# Patient Record
Sex: FEMALE | Race: BLACK OR AFRICAN AMERICAN | NOT HISPANIC OR LATINO | Employment: UNEMPLOYED | URBAN - METROPOLITAN AREA
[De-identification: names, ages, dates, MRNs, and addresses within clinical notes are randomized per-mention and may not be internally consistent; named-entity substitution may affect disease eponyms.]

---

## 2018-10-02 ENCOUNTER — HOSPITAL ENCOUNTER (EMERGENCY)
Facility: HOSPITAL | Age: 38
Discharge: HOME | End: 2018-10-02
Attending: EMERGENCY MEDICINE

## 2018-10-02 VITALS
OXYGEN SATURATION: 99 % | SYSTOLIC BLOOD PRESSURE: 130 MMHG | HEART RATE: 92 BPM | BODY MASS INDEX: 20.83 KG/M2 | RESPIRATION RATE: 16 BRPM | TEMPERATURE: 99 F | HEIGHT: 65 IN | DIASTOLIC BLOOD PRESSURE: 88 MMHG | WEIGHT: 125 LBS

## 2018-10-02 DIAGNOSIS — S09.90XA TRAUMATIC INJURY OF HEAD, INITIAL ENCOUNTER: Primary | ICD-10-CM
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
## 2018-10-02 LAB
B-HCG UR QL: NEGATIVE
CTP QC/QA: YES

## 2018-10-02 PROCEDURE — 99284 EMERGENCY DEPT VISIT MOD MDM: CPT | Mod: ,,, | Performed by: PHYSICIAN ASSISTANT

## 2018-10-02 PROCEDURE — 25000003 PHARM REV CODE 250: Performed by: PHYSICIAN ASSISTANT

## 2018-10-02 PROCEDURE — 99284 EMERGENCY DEPT VISIT MOD MDM: CPT | Mod: 25

## 2018-10-02 PROCEDURE — 81025 URINE PREGNANCY TEST: CPT | Performed by: EMERGENCY MEDICINE

## 2018-10-02 RX ORDER — ACETAMINOPHEN 500 MG
1000 TABLET ORAL
Status: COMPLETED | OUTPATIENT
Start: 2018-10-02 | End: 2018-10-02

## 2018-10-02 RX ADMIN — ACETAMINOPHEN 1000 MG: 500 TABLET ORAL at 03:10
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
## 2018-10-02 NOTE — DISCHARGE INSTRUCTIONS
Take tylenol on a scheduled basis for pain relief. Continue to apply ice to area of swelling to reduce swelling.   Call and establish care with primary care physician for further evaluation if you continue to have symptoms after 2 weeks or return to the emergency department.    No future appointments.    Our goal in the emergency department is to always give you outstanding care and exceptional service. You may receive a survey by mail or e-mail in the next week regarding your experience in our ED. We would greatly appreciate your completing and returning the survey. Your feedback provides us with a way to recognize our staff who give very good care and it helps us learn how to improve when your experience was below our aspiration of excellence.

## 2023-03-20 ENCOUNTER — HOSPITAL ENCOUNTER (EMERGENCY)
Facility: HOSPITAL | Age: 43
Discharge: HOME | End: 2023-03-20
Attending: EMERGENCY MEDICINE

## 2023-03-20 VITALS
SYSTOLIC BLOOD PRESSURE: 141 MMHG | HEIGHT: 64 IN | HEART RATE: 65 BPM | WEIGHT: 128 LBS | RESPIRATION RATE: 18 BRPM | TEMPERATURE: 98 F | DIASTOLIC BLOOD PRESSURE: 80 MMHG | OXYGEN SATURATION: 100 % | BODY MASS INDEX: 21.85 KG/M2

## 2023-03-20 DIAGNOSIS — M25.562 ACUTE PAIN OF LEFT KNEE: Primary | ICD-10-CM

## 2023-03-20 LAB — B-HCG UR QL: NEGATIVE

## 2023-03-20 PROCEDURE — 25000003 PHARM REV CODE 250: Performed by: PHYSICIAN ASSISTANT

## 2023-03-20 PROCEDURE — 63600175 PHARM REV CODE 636 W HCPCS: Performed by: EMERGENCY MEDICINE

## 2023-03-20 PROCEDURE — 99284 EMERGENCY DEPT VISIT MOD MDM: CPT

## 2023-03-20 PROCEDURE — 96372 THER/PROPH/DIAG INJ SC/IM: CPT | Performed by: EMERGENCY MEDICINE

## 2023-03-20 PROCEDURE — 81025 URINE PREGNANCY TEST: CPT | Performed by: EMERGENCY MEDICINE

## 2023-03-20 RX ORDER — NAPROXEN 500 MG/1
500 TABLET ORAL 2 TIMES DAILY PRN
Qty: 12 TABLET | Refills: 0 | Status: SHIPPED | OUTPATIENT
Start: 2023-03-20

## 2023-03-20 RX ORDER — KETOROLAC TROMETHAMINE 30 MG/ML
15 INJECTION, SOLUTION INTRAMUSCULAR; INTRAVENOUS
Status: COMPLETED | OUTPATIENT
Start: 2023-03-20 | End: 2023-03-20

## 2023-03-20 RX ORDER — IBUPROFEN 600 MG/1
600 TABLET ORAL
Status: COMPLETED | OUTPATIENT
Start: 2023-03-20 | End: 2023-03-20

## 2023-03-20 RX ADMIN — KETOROLAC TROMETHAMINE 15 MG: 30 INJECTION, SOLUTION INTRAMUSCULAR at 06:03

## 2023-03-20 RX ADMIN — IBUPROFEN 600 MG: 600 TABLET, FILM COATED ORAL at 03:03

## 2023-03-20 NOTE — ED NOTES
Assumed care:  Rosaura Mason is awake, alert and oriented x 3, skin warm and dry, in NAD,  CO left knee pain 10/10, denies injury.    Patient identifiers for Rosaura Mason checked and correct.  LOC:  Rosaura Mason is awake, alert, and aware of environment with an appropriate affect. She is oriented x 3 and speaking appropriately.  APPEARANCE:  She is resting comfortably and in no acute distress. She is clean and well groomed, patient's clothing is properly fastened.  SKIN:  The skin is warm and dry. She has normal skin turgor and moist mucus membranes. Skin is intact; no bruising or breakdown noted.  MUSCULOSKELETAL:  She is moving all extremities well, no obvious deformities noted. Pulses intact.  Left knee pain  RESPIRATORY:  Airway is open and patent. Respirations are spontaneous and non-labored with normal effort and rate.  CARDIAC:  She has a normal rate and rhythm. No peripheral edema noted. Capillary refill < 3 seconds.  ABDOMEN:  No distention noted.  Soft and non-tender upon palpation.  NEUROLOGICAL:  PERRL. Facial expression is symmetrical. Hand grasps are equal bilaterally. Normal sensation in all extremities when touched with finger.  Allergies reported:  Review of patient's allergies indicates:  No Known Allergies

## 2023-03-20 NOTE — ED PROVIDER NOTES
Encounter Date: 3/20/2023    SCRIBE #1 NOTE: I, Enochzee Tejeda, am scribing for, and in the presence of,  Roman Hutchinson MD.     History     Chief Complaint   Patient presents with    Knee Pain     Left knee pain x 3 days / denies injury      Time seen by provider: 5:23 PM on 03/20/2023    Rosaura Mason is a 42 y.o. female who presents to the ED with an onset of pain and swelling to her left knee that began 3 days ago. The patient states she is able to walk, but it hurts. The patient denies any prior injury or athletic history. The patient states she has dealt with 's knee in the past. The patient denies  any other symptoms at this time. There is no recorded PMHx or PSHx.     The history is provided by the patient.   Review of patient's allergies indicates:  No Known Allergies  No past medical history on file.  No past surgical history on file.  No family history on file.  Social History     Tobacco Use    Smoking status: Never    Smokeless tobacco: Never   Substance Use Topics    Alcohol use: No    Drug use: No     Review of Systems   Constitutional:  Negative for fever.   HENT:  Negative for sore throat.    Respiratory:  Negative for shortness of breath.    Cardiovascular:  Negative for chest pain.   Gastrointestinal:  Negative for nausea.   Genitourinary:  Negative for dysuria.   Musculoskeletal:  Positive for arthralgias and joint swelling. Negative for back pain.   Skin:  Negative for rash.   Neurological:  Negative for weakness.   Hematological:  Does not bruise/bleed easily.     Physical Exam     Initial Vitals [03/20/23 1522]   BP Pulse Resp Temp SpO2   (!) 150/84 86 20 98.3 °F (36.8 °C) 100 %      MAP       --         Physical Exam    Nursing note and vitals reviewed.  Constitutional: She appears well-developed and well-nourished. She is not diaphoretic. No distress.   HENT:   Head: Normocephalic and atraumatic.   Mouth/Throat: Oropharynx is clear and moist.   Eyes: Conjunctivae are normal.   Neck:  Neck supple.   Cardiovascular:  Normal rate, regular rhythm, normal heart sounds and intact distal pulses.     Exam reveals no gallop and no friction rub.       No murmur heard.  Pulses:       Dorsalis pedis pulses are 2+ on the right side and 2+ on the left side.        Posterior tibial pulses are 2+ on the right side and 2+ on the left side.   Pulmonary/Chest: Breath sounds normal. She has no wheezes. She has no rhonchi. She has no rales.   Musculoskeletal:      Cervical back: Neck supple.      Left knee: No effusion. Decreased range of motion (limited active ROM secondary to pain). No tenderness.      Comments: No effusion or warmth of the left knee.     Neurological: She is alert and oriented to person, place, and time.   5/5 strength and sensation to BLE's.    Skin: No rash noted. No erythema.   Psychiatric: Her speech is normal.       ED Course   Procedures  Labs Reviewed   PREGNANCY TEST, URINE RAPID    Narrative:     Specimen Source->Urine          Imaging Results              X-Ray Knee 3 View Left (Final result)  Result time 03/20/23 16:01:27      Final result by Scotty Cooper MD (03/20/23 16:01:27)                   Impression:      Severe left knee osteoarthritis.  Possible large suprapatellar loose body.      Electronically signed by: Scotty Cooper MD  Date:    03/20/2023  Time:    16:01               Narrative:    EXAMINATION:  XR KNEE 3 VIEW LEFT    CLINICAL HISTORY:  Unspecified injury of left lower leg, initial encounter    TECHNIQUE:  AP, lateral, and Merchant views of the left knee were performed.    COMPARISON:  None    FINDINGS:  There are bulky tricompartment marginal osteophytes.  Moderate medial compartment joint space loss is present.  No fracture or dislocation.  There is a bulky 2.5 cm os along the suprapatellar bursa on lateral view which could represent a loose body.  No joint effusion.                                  (radiology reading, visualized by me)    The patient was  informed of the incidental finding(s) as well as the need for PCP or specialist follow-up for reevaluation and possible further investigation or monitoring.       Medications   ibuprofen tablet 600 mg (600 mg Oral Given 3/20/23 1545)   ketorolac injection 15 mg (15 mg Intramuscular Given 3/20/23 1805)     Medical Decision Making:   History:   Old Medical Records: I decided to obtain old medical records.  Clinical Tests:   Lab Tests: Ordered and Reviewed  Radiological Study: Ordered and Reviewed        Scribe Attestation:   Scribe #1: I performed the above scribed service and the documentation accurately describes the services I performed. I attest to the accuracy of the note.            I, Dr. Roman Hutchinson, personally performed the services described in this documentation. All medical record entries made by the scribe were at my direction and in my presence.  I have reviewed the chart and agree that the record reflects my personal performance and is accurate and complete. Roman Hutchinson MD.  6:19 PM 03/20/2023    Rosaura Mason is a 42 y.o. female presenting with atraumatic left knee pain.  Patient has signs of advanced osteoarthritis, possibly contributing to current symptoms.  Differential including meniscal or ligamentous dysfunction also discussed.  I have very low suspicion for life or limb threatening process such as septic joint.  I do not think emergent arthrocentesis or surgical intervention is indicated.  She is appropriate for outpatient orthopedic follow-up with referral given.  NSAIDs as necessary recommended for pain with prescription given.  Dose of ketorolac given here at patient's request.  There is no leg asymmetry or calf tenderness.  I doubt DVT.  Detailed return precautions reviewed.         Clinical Impression:   Final diagnoses:  [M25.562] Acute pain of left knee (Primary)        ED Disposition Condition    Discharge Stable          ED Prescriptions       Medication Sig Dispense Start  Date End Date Auth. Provider    naproxen (NAPROSYN) 500 MG tablet Take 1 tablet (500 mg total) by mouth 2 (two) times daily as needed (pain). 12 tablet 3/20/2023 -- Roman Hutchinson MD          Follow-up Information       Follow up With Specialties Details Why Contact Info    Basim Mcconnell MD Orthopedic Surgery  Orthopedics, 1 week [address] [facility]  [facility] [address]  [phone]               Roman Hutchinson MD  03/20/23 7490

## 2023-03-20 NOTE — FIRST PROVIDER EVALUATION
Emergency Department TeleTriage Encounter Note      CHIEF COMPLAINT    Chief Complaint   Patient presents with    Knee Pain     Left knee pain x 3 days / denies injury        VITAL SIGNS   Initial Vitals [03/20/23 1522]   BP Pulse Resp Temp SpO2   (!) 150/84 86 20 98.3 °F (36.8 °C) 100 %      MAP       --            ALLERGIES    Review of patient's allergies indicates:  No Known Allergies    PROVIDER TRIAGE NOTE  Patient presenting with left knee pain, no known injury.  Her pain began 4 days ago.  She has swelling, no redness or fever.     She took ASA this morning and muscle relaxer.      Will order xray pending ED provider evaluation.      Initial orders will be placed and care will be transferred to an alternate provider when patient is roomed for a full evaluation. Any additional orders and the final disposition will be determined by that provider.         ORDERS  Labs Reviewed - No data to display    ED Orders (720h ago, onward)      None              Virtual Visit Note: The provider triage portion of this emergency department evaluation and documentation was performed via [employer], a HIPAA-compliant telemedicine application, in concert with a tele-presenter in the room. A face to face patient evaluation with one of my colleagues will occur once the patient is placed in an emergency department room.      DISCLAIMER: This note was prepared with [employer] voice recognition transcription software. Garbled syntax, mangled pronouns, and other bizarre constructions may be attributed to that software system.

## 2023-03-20 NOTE — DISCHARGE INSTRUCTIONS
[facility] [place] - [phone] - If needed for orthopedic follow up